# Patient Record
(demographics unavailable — no encounter records)

---

## 2024-10-23 NOTE — ASSESSMENT
[FreeTextEntry1] : I had a long discussion with the patient regarding her treatment plan and diagnosis.  Overall she is doing well.  No severe neck pain.  She is doing her actives of daily living with her collar on.  I will have her follow-up in 4 weeks.  At that point we will get flexion-extension films to ensure fracture stability and consider possibly removing the collar at that point.

## 2024-10-23 NOTE — HISTORY OF PRESENT ILLNESS
[de-identified] : 51 yo female following up fro her C6-C7 lamina fracture with bilateral subluxation noted on MRI. DOI 8/25/2024, 8 weeks from injury.   10/2/2024 52 year old female presenting to the office for a follow up evaluation of neck pain. She continues to have pain pertaining to her neck at this time. She is currently wearing a neck brace at this time. She states that she has difficulty laying down due to pain.   09/18/2024 ROSIO CHANG is a 52 year old female presenting to the office for a follow up evaluation of neck pain.  Today the patient states that she still dealing with neck pain.  She has been adjusting her collar.  She denies any radiating type pain.  She denies any weakness numbness or tingling.   09.04.2024 Patient is a 52-year-old female who presents for initial eval np. Patient states she was at an indoor park on August 25, and she slid down a slide and landed on her head, neck, and shoulders. States she went to the ER the day after this injury. Today, she is wearing a cervical collar. Improvement of neck and shoulder sxs with castor oil rub and hot showers. Denies any significant neck pain or UE radicular sxs.

## 2024-10-23 NOTE — PHYSICAL EXAM
[de-identified] : Cervical Physical Exam   Gait - Normal   Station - Normal   Sagittal balance - Normal   Compensatory mechanism? - None   Horizontal gaze - Maintained    Reflexes   Biceps - Normal   Triceps - Normal   Brachioradialis - Normal   Patellar - Normal   Gastroc - Normal   Clonus -No   Hoffmans - None   Shoulder exam- normal   Spurling's - None   Wrist Pulses -2+ radial/ulnar   Foot Pulses -2+ DP/PT   Cervical range of motion - Normal   Sensation   C5-T1 sensation intact to light touch bilaterally   L1-S1 sensation intact to light touch bilaterally   Motor          Deltoid Bicep Triceps WF WE IO  Right    5/5     5/5       5/5    5/5  5/5 5/5 5/5 Left      5/5     5/5       5/5    5/5  5/5 5/5 5/5             IP Quad HS TA Gastroc EHL Right 5/5  5/5  5/5 5/5    5/5      5/5 Left   5/5  5/5  5/5 5/5    5/5      5/5  Of note the patient came in with a loose collar.  This had to be tightened [de-identified] : previous imaging Cervical MRI: C6-C7 laminar fracture, bilateral subluxation  Cervical radiographs, 10/23 Stable overall alignment

## 2024-11-20 NOTE — PHYSICAL EXAM
[de-identified] : Cervical Physical Exam   Gait - Normal   Station - Normal   Sagittal balance - Normal   Compensatory mechanism? - None   Horizontal gaze - Maintained    Reflexes   Biceps - Normal   Triceps - Normal   Brachioradialis - Normal   Patellar - Normal   Gastroc - Normal   Clonus -No   Hoffmans - None   Shoulder exam- normal   Spurling's - None   Wrist Pulses -2+ radial/ulnar   Foot Pulses -2+ DP/PT   Cervical range of motion - Normal   Sensation   C5-T1 sensation intact to light touch bilaterally   L1-S1 sensation intact to light touch bilaterally   Motor          Deltoid Bicep Triceps WF WE IO  Right    5/5     5/5       5/5    5/5  5/5 5/5 5/5 Left      5/5     5/5       5/5    5/5  5/5 5/5 5/5             IP Quad HS TA Gastroc EHL Right 5/5  5/5  5/5 5/5    5/5      5/5 Left   5/5  5/5  5/5 5/5    5/5      5/5  Of note the patient came in with a loose collar.  This had to be tightened [de-identified] : Cervical xray C6-C7  is subluxated, but appears stable with no motion on flexion/extension  previous imaging Cervical MRI: C6-C7 laminar fracture, bilateral subluxation  Cervical radiographs, 10/23 Stable overall alignment

## 2024-11-20 NOTE — ASSESSMENT
[FreeTextEntry1] : I had a long discussion with the patient regarding her treatment plan and diagnosis.  Overall she is doing well. Patient may wean from cervical collar as tolerated. However, patient should use the cervical collar when she experiences cervical fatigue. The patient will follow up with me in 2 weeks to discuss starting physical therapy. I encouraged the patient to follow-up sooner if there are any new or worsening symptoms.

## 2024-11-20 NOTE — HISTORY OF PRESENT ILLNESS
[de-identified] : 53 yo female following up 12 weeks out s/p from her C6-C7 lamina fracture with bilateral subluxation noted on MRI. DOI 8/25/2024, 8 weeks from injury. Patient reports symptomatic improvement from previous visit with immobilization in a cervical collar. She does report of shortness of breath and right sided chest pain when ascending and descending stairs and bending forward.   10.23.24 53 yo female following up from her C6-C7 lamina fracture with bilateral subluxation noted on MRI. DOI 8/25/2024, 8 weeks from injury.   10/2/2024 52 year old female presenting to the office for a follow up evaluation of neck pain. She continues to have pain pertaining to her neck at this time. She is currently wearing a neck brace at this time. She states that she has difficulty laying down due to pain.   09/18/2024 ROSIO CHANG is a 52 year old female presenting to the office for a follow up evaluation of neck pain.  Today the patient states that she still dealing with neck pain.  She has been adjusting her collar.  She denies any radiating type pain.  She denies any weakness numbness or tingling.   09.04.2024 Patient is a 52-year-old female who presents for initial eval np. Patient states she was at an indoor park on August 25, and she slid down a slide and landed on her head, neck, and shoulders. States she went to the ER the day after this injury. Today, she is wearing a cervical collar. Improvement of neck and shoulder sxs with castor oil rub and hot showers. Denies any significant neck pain or UE radicular sxs.

## 2024-12-09 NOTE — ASSESSMENT
[FreeTextEntry1] : I had a long discussion with the patient regarding her treatment plan and diagnosis.  Overall she is doing well. At this time, the patient should refrain from physical therapy. I discussed that the patient should follow up with her PCP about her frequent headaches. The patient will follow up with me in 2 weeks to discuss starting physical therapy. I encouraged the patient to follow-up sooner if there are any new or worsening symptoms.

## 2024-12-09 NOTE — PHYSICAL EXAM
[de-identified] : Cervical Physical Exam   Gait - Normal   Station - Normal   Sagittal balance - Normal   Compensatory mechanism? - None   Horizontal gaze - Maintained    Reflexes   Biceps - Normal   Triceps - Normal   Brachioradialis - Normal   Patellar - Normal   Gastroc - Normal   Clonus -No   Hoffmans - None   Shoulder exam- normal   Spurling's - None   Wrist Pulses -2+ radial/ulnar   Foot Pulses -2+ DP/PT   Cervical range of motion - Normal   Sensation   C5-T1 sensation intact to light touch bilaterally   L1-S1 sensation intact to light touch bilaterally   Motor          Deltoid Bicep Triceps WF WE IO  Right    5/5 5/5       5/5    5/5  5/5 5/5 5/5 Left      5/5     5/5       5/5 5/5  5/5 5/5 5/5             IP Quad HS TA Gastroc EHL Right 5/5 5/5  5/5 5/5    5/5      5/5 Left   5/5  5/5  5/5 5/5    5/5      5/5 [de-identified] : Cervical xray C6-C7  is subluxated, but appears stable with no motion on flexion/extension Stable alignment  previous imaging Cervical MRI: C6-C7 laminar fracture, bilateral subluxation  Cervical radiographs, 10/23 Stable overall alignment

## 2024-12-09 NOTE — ADDENDUM
[FreeTextEntry1] :  I, Neda Bailey, acted solely as a scribe for Dr. Rush Booth MD on this date 12/09/2024    All medical record entries made by the Scribe were at my, Dr. Rush Booth MD., direction and personally dictated by me on 12/09/2024 . I have reviewed the chart and agree that the record accurately reflects my personal performance of the history, physical exam, assessment and plan. I have also personally directed, reviewed, and agreed with the chart.

## 2024-12-09 NOTE — HISTORY OF PRESENT ILLNESS
[de-identified] : 51 yo female following up 3 months out s/p from her C6-C7 lamina fracture with bilateral subluxation noted on MRI. DOI 8/25/2024, 10 weeks from injury. She reports occasional headaches. She also reports pain in the back of her neck which is exacerbated by turning her neck.   11.20.24 51 yo female following up 12 weeks out s/p from her C6-C7 lamina fracture with bilateral subluxation noted on MRI. DOI 8/25/2024, 8 weeks from injury. Patient reports symptomatic improvement from previous visit with immobilization in a cervical collar. She does report of shortness of breath and right sided chest pain when ascending and descending stairs and bending forward.   10.23.24 51 yo female following up from her C6-C7 lamina fracture with bilateral subluxation noted on MRI. DOI 8/25/2024, 8 weeks from injury.   10/2/2024 52 year old female presenting to the office for a follow up evaluation of neck pain. She continues to have pain pertaining to her neck at this time. She is currently wearing a neck brace at this time. She states that she has difficulty laying down due to pain.   09/18/2024 ROSIO CHANG is a 52 year old female presenting to the office for a follow up evaluation of neck pain.  Today the patient states that she still dealing with neck pain.  She has been adjusting her collar.  She denies any radiating type pain.  She denies any weakness numbness or tingling.   09.04.2024 Patient is a 52-year-old female who presents for initial eval np. Patient states she was at an indoor park on August 25, and she slid down a slide and landed on her head, neck, and shoulders. States she went to the ER the day after this injury. Today, she is wearing a cervical collar. Improvement of neck and shoulder sxs with castor oil rub and hot showers. Denies any significant neck pain or UE radicular sxs.

## 2025-01-08 NOTE — HISTORY OF PRESENT ILLNESS
[de-identified] : 54 yo female following up 4.5 months out s/p from her C6-C7 lamina fracture with bilateral subluxation noted on MRI. DOI 8/25/2024. She reports that she feels dizzy when she looks down or turns around quickly. She states that she has some shoulder pain that is worse in the morning.   12.09.24 54 yo female following up 3 months out s/p from her C6-C7 lamina fracture with bilateral subluxation noted on MRI. DOI 8/25/2024, 10 weeks from injury. She reports occasional headaches. She also reports pain in the back of her neck which is exacerbated by turning her neck.   11.20.24 51 yo female following up 12 weeks out s/p from her C6-C7 lamina fracture with bilateral subluxation noted on MRI. DOI 8/25/2024, 8 weeks from injury. Patient reports symptomatic improvement from previous visit with immobilization in a cervical collar. She does report of shortness of breath and right sided chest pain when ascending and descending stairs and bending forward.   10.23.24 51 yo female following up from her C6-C7 lamina fracture with bilateral subluxation noted on MRI. DOI 8/25/2024, 8 weeks from injury.   10/2/2024 52 year old female presenting to the office for a follow up evaluation of neck pain. She continues to have pain pertaining to her neck at this time. She is currently wearing a neck brace at this time. She states that she has difficulty laying down due to pain.   09/18/2024 ROSIO CHANG is a 52 year old female presenting to the office for a follow up evaluation of neck pain.  Today the patient states that she still dealing with neck pain.  She has been adjusting her collar.  She denies any radiating type pain.  She denies any weakness numbness or tingling.   09.04.2024 Patient is a 52-year-old female who presents for initial eval np. Patient states she was at an indoor park on August 25, and she slid down a slide and landed on her head, neck, and shoulders. States she went to the ER the day after this injury. Today, she is wearing a cervical collar. Improvement of neck and shoulder sxs with castor oil rub and hot showers. Denies any significant neck pain or UE radicular sxs.

## 2025-01-08 NOTE — ASSESSMENT
[FreeTextEntry1] : I had a long discussion with the patient regarding her treatment plan and diagnosis. Overall she is doing well. The patient will start a home exercise program at this time. The patient will follow up with me in 3 weeks to discuss starting physical therapy. I encouraged the patient to follow-up sooner if there are any new or worsening symptoms.

## 2025-01-08 NOTE — PHYSICAL EXAM
[de-identified] : Cervical Physical Exam   Gait - Normal   Station - Normal   Sagittal balance - Normal   Compensatory mechanism? - None   Horizontal gaze - Maintained    Reflexes   Biceps - Normal   Triceps - Normal   Brachioradialis - Normal   Patellar - Normal   Gastroc - Normal   Clonus -No   Hoffmans - None   Shoulder exam- normal   Spurling's - None   Wrist Pulses -2+ radial/ulnar   Foot Pulses -2+ DP/PT   Cervical range of motion - Normal   Sensation   C5-T1 sensation intact to light touch bilaterally   L1-S1 sensation intact to light touch bilaterally   Motor          Deltoid Bicep Triceps WF WE IO  Right    5/5 5/5       5/5    5/5  5/5 5/5 5/5 Left      5/5     5/5       5/5 5/5  5/5 5/5 5/5             IP Quad HS TA Gastroc EHL Right 5/5 5/5  5/5 5/5    5/5      5/5 Left   5/5  5/5  5/5 5/5    5/5      5/5 [de-identified] : previous imaging: Cervical xray C6-C7  is subluxated, but appears stable with no motion on flexion/extension Stable alignment  Cervical MRI: C6-C7 laminar fracture, bilateral subluxation  Cervical radiographs, 10/23 Stable overall alignment

## 2025-01-08 NOTE — ADDENDUM
[FreeTextEntry1] :  I, Neda Bailey, acted solely as a scribe for Dr. Rush Booth MD on this date 01/08/2025    All medical record entries made by the Scribe were at my, Dr. Rush Booth MD., direction and personally dictated by me on 01/08/2025 . I have reviewed the chart and agree that the record accurately reflects my personal performance of the history, physical exam, assessment and plan. I have also personally directed, reviewed, and agreed with the chart.

## 2025-01-08 NOTE — ASSESSMENT
[FreeTextEntry1] : I had a long discussion with the patient regarding her treatment plan and diagnosis. Overall she is doing well. The patient will start a home exercise program at this time. The patient will follow up with me in 3 weeks to discuss starting physical therapy. I encouraged the patient to follow-up sooner if there are any new or worsening symptoms.      none

## 2025-01-08 NOTE — PHYSICAL EXAM
[de-identified] : Cervical Physical Exam   Gait - Normal   Station - Normal   Sagittal balance - Normal   Compensatory mechanism? - None   Horizontal gaze - Maintained    Reflexes   Biceps - Normal   Triceps - Normal   Brachioradialis - Normal   Patellar - Normal   Gastroc - Normal   Clonus -No   Hoffmans - None   Shoulder exam- normal   Spurling's - None   Wrist Pulses -2+ radial/ulnar   Foot Pulses -2+ DP/PT   Cervical range of motion - Normal   Sensation   C5-T1 sensation intact to light touch bilaterally   L1-S1 sensation intact to light touch bilaterally   Motor          Deltoid Bicep Triceps WF WE IO  Right    5/5 5/5       5/5    5/5  5/5 5/5 5/5 Left      5/5     5/5       5/5 5/5  5/5 5/5 5/5             IP Quad HS TA Gastroc EHL Right 5/5 5/5  5/5 5/5    5/5      5/5 Left   5/5  5/5  5/5 5/5    5/5      5/5 [de-identified] : previous imaging: Cervical xray C6-C7  is subluxated, but appears stable with no motion on flexion/extension Stable alignment  Cervical MRI: C6-C7 laminar fracture, bilateral subluxation  Cervical radiographs, 10/23 Stable overall alignment

## 2025-01-08 NOTE — HISTORY OF PRESENT ILLNESS
[de-identified] : 54 yo female following up 4.5 months out s/p from her C6-C7 lamina fracture with bilateral subluxation noted on MRI. DOI 8/25/2024. She reports that she feels dizzy when she looks down or turns around quickly. She states that she has some shoulder pain that is worse in the morning.   12.09.24 54 yo female following up 3 months out s/p from her C6-C7 lamina fracture with bilateral subluxation noted on MRI. DOI 8/25/2024, 10 weeks from injury. She reports occasional headaches. She also reports pain in the back of her neck which is exacerbated by turning her neck.   11.20.24 51 yo female following up 12 weeks out s/p from her C6-C7 lamina fracture with bilateral subluxation noted on MRI. DOI 8/25/2024, 8 weeks from injury. Patient reports symptomatic improvement from previous visit with immobilization in a cervical collar. She does report of shortness of breath and right sided chest pain when ascending and descending stairs and bending forward.   10.23.24 51 yo female following up from her C6-C7 lamina fracture with bilateral subluxation noted on MRI. DOI 8/25/2024, 8 weeks from injury.   10/2/2024 52 year old female presenting to the office for a follow up evaluation of neck pain. She continues to have pain pertaining to her neck at this time. She is currently wearing a neck brace at this time. She states that she has difficulty laying down due to pain.   09/18/2024 ROSIO CHANG is a 52 year old female presenting to the office for a follow up evaluation of neck pain.  Today the patient states that she still dealing with neck pain.  She has been adjusting her collar.  She denies any radiating type pain.  She denies any weakness numbness or tingling.   09.04.2024 Patient is a 52-year-old female who presents for initial eval np. Patient states she was at an indoor park on August 25, and she slid down a slide and landed on her head, neck, and shoulders. States she went to the ER the day after this injury. Today, she is wearing a cervical collar. Improvement of neck and shoulder sxs with castor oil rub and hot showers. Denies any significant neck pain or UE radicular sxs.

## 2025-02-03 NOTE — ASSESSMENT
[FreeTextEntry1] : I had a long discussion with the patient regarding her treatment plan and diagnosis. Overall she is doing well. The patient will start physical therapy and continue a home exercise program at this time. She will continue following up with a neurologist. The patient will follow up with me in 4 weeks for repeat clinical evaluation. I encouraged the patient to follow-up sooner if there are any new or worsening symptoms.

## 2025-02-03 NOTE — HISTORY OF PRESENT ILLNESS
[de-identified] : 54 yo female following up 5 months out s/p from her C6-C7 lamina fracture with bilateral subluxation noted on MRI. DOI 8/25/2024. She reports that she has pain in her neck which is worse in the morning. She states that she has dizziness in the morning as well. She states that she has stiffness in her neck which is exacerbated by cold weather. She states that she applies castor oil on her neck which provides relief. Patient reports that she has been doing the home exercises which have provided relief. She states that she is scheduled for a neurology appointment.   01.08.25 54 yo female following up 4.5 months out s/p from her C6-C7 lamina fracture with bilateral subluxation noted on MRI. DOI 8/25/2024. She reports that she feels dizzy when she looks down or turns around quickly. She states that she has some shoulder pain that is worse in the morning.   12.09.24 54 yo female following up 3 months out s/p from her C6-C7 lamina fracture with bilateral subluxation noted on MRI. DOI 8/25/2024, 10 weeks from injury. She reports occasional headaches. She also reports pain in the back of her neck which is exacerbated by turning her neck.   11.20.24 53 yo female following up 12 weeks out s/p from her C6-C7 lamina fracture with bilateral subluxation noted on MRI. DOI 8/25/2024, 8 weeks from injury. Patient reports symptomatic improvement from previous visit with immobilization in a cervical collar. She does report of shortness of breath and right sided chest pain when ascending and descending stairs and bending forward.   10.23.24 53 yo female following up from her C6-C7 lamina fracture with bilateral subluxation noted on MRI. DOI 8/25/2024, 8 weeks from injury.   10/2/2024 52 year old female presenting to the office for a follow up evaluation of neck pain. She continues to have pain pertaining to her neck at this time. She is currently wearing a neck brace at this time. She states that she has difficulty laying down due to pain.   09/18/2024 ROSIO CHANG is a 52 year old female presenting to the office for a follow up evaluation of neck pain.  Today the patient states that she still dealing with neck pain.  She has been adjusting her collar.  She denies any radiating type pain.  She denies any weakness numbness or tingling.   09.04.2024 Patient is a 52-year-old female who presents for initial eval np. Patient states she was at an indoor park on August 25, and she slid down a slide and landed on her head, neck, and shoulders. States she went to the ER the day after this injury. Today, she is wearing a cervical collar. Improvement of neck and shoulder sxs with castor oil rub and hot showers. Denies any significant neck pain or UE radicular sxs.

## 2025-02-03 NOTE — PHYSICAL EXAM
[de-identified] : Cervical Physical Exam   Gait - Normal   Station - Normal   Sagittal balance - Normal   Compensatory mechanism? - None   Horizontal gaze - Maintained    Reflexes   Biceps - Normal   Triceps - Normal   Brachioradialis - Normal   Patellar - Normal   Gastroc - Normal   Clonus -No   Hoffmans - None   Shoulder exam- normal   Spurling's - None   Wrist Pulses -2+ radial/ulnar   Foot Pulses -2+ DP/PT   Cervical range of motion - Normal   Sensation   C5-T1 sensation intact to light touch bilaterally   L1-S1 sensation intact to light touch bilaterally   Motor          Deltoid Bicep Triceps WF WE IO  Right    5/5 5/5       5/5    5/5  5/5 5/5 5/5 Left      5/5     5/5       5/5 5/5  5/5 5/5 5/5             IP Quad HS TA Gastroc EHL Right 5/5 5/5  5/5 5/5    5/5      5/5 Left   5/5  5/5  5/5 5/5    5/5      5/5 [de-identified] : previous imaging: Cervical xray C6-C7  is subluxated, but appears stable with no motion on flexion/extension Stable alignment  Cervical MRI: C6-C7 laminar fracture, bilateral subluxation  Cervical radiographs, 10/23 Stable overall alignment

## 2025-02-03 NOTE — ADDENDUM
[FreeTextEntry1] :  I, Neda Bailey, acted solely as a scribe for Dr. Rush Booth MD on this date 02/03/2025    All medical record entries made by the Scribe were at my, Dr. Rush Booth MD., direction and personally dictated by me on 02/03/2025 . I have reviewed the chart and agree that the record accurately reflects my personal performance of the history, physical exam, assessment and plan. I have also personally directed, reviewed, and agreed with the chart.

## 2025-03-19 NOTE — HISTORY OF PRESENT ILLNESS
[de-identified] : 52 yo female following up 6.5 months out s/p from her C6-C7 lamina fracture with bilateral subluxation noted on MRI. DOI 8/25/2024. She has been attending physical therapy which has provided relief. She states that her cervical range of motion has improved. She reports that she feels her neck is stronger. She is not taking any pain medications.  02.03.25 52 yo female following up 5 months out s/p from her C6-C7 lamina fracture with bilateral subluxation noted on MRI. DOI 8/25/2024. She reports that she has pain in her neck which is worse in the morning. She states that she has dizziness in the morning as well. She states that she has stiffness in her neck which is exacerbated by cold weather. She states that she applies castor oil on her neck which provides relief. Patient reports that she has been doing the home exercises which have provided relief. She states that she is scheduled for a neurology appointment.   01.08.25 52 yo female following up 4.5 months out s/p from her C6-C7 lamina fracture with bilateral subluxation noted on MRI. DOI 8/25/2024. She reports that she feels dizzy when she looks down or turns around quickly. She states that she has some shoulder pain that is worse in the morning.   12.09.24 52 yo female following up 3 months out s/p from her C6-C7 lamina fracture with bilateral subluxation noted on MRI. DOI 8/25/2024, 10 weeks from injury. She reports occasional headaches. She also reports pain in the back of her neck which is exacerbated by turning her neck.   11.20.24 51 yo female following up 12 weeks out s/p from her C6-C7 lamina fracture with bilateral subluxation noted on MRI. DOI 8/25/2024, 8 weeks from injury. Patient reports symptomatic improvement from previous visit with immobilization in a cervical collar. She does report of shortness of breath and right sided chest pain when ascending and descending stairs and bending forward.   10.23.24 51 yo female following up from her C6-C7 lamina fracture with bilateral subluxation noted on MRI. DOI 8/25/2024, 8 weeks from injury.   10/2/2024 52 year old female presenting to the office for a follow up evaluation of neck pain. She continues to have pain pertaining to her neck at this time. She is currently wearing a neck brace at this time. She states that she has difficulty laying down due to pain.   09/18/2024 ROSIO CHANG is a 52 year old female presenting to the office for a follow up evaluation of neck pain.  Today the patient states that she still dealing with neck pain.  She has been adjusting her collar.  She denies any radiating type pain.  She denies any weakness numbness or tingling.   09.04.2024 Patient is a 52-year-old female who presents for initial eval np. Patient states she was at an indoor park on August 25, and she slid down a slide and landed on her head, neck, and shoulders. States she went to the ER the day after this injury. Today, she is wearing a cervical collar. Improvement of neck and shoulder sxs with castor oil rub and hot showers. Denies any significant neck pain or UE radicular sxs.

## 2025-03-19 NOTE — ASSESSMENT
[FreeTextEntry1] : I had a long discussion with the patient regarding her treatment plan and diagnosis. Overall she is doing well. The patient will start physical therapy and continue a home exercise program at this time. The patient will follow up with me in 3 months for repeat clinical evaluation. I encouraged the patient to follow-up sooner if there are any new or worsening symptoms.

## 2025-03-19 NOTE — PHYSICAL EXAM
[de-identified] : Cervical Physical Exam   Gait - Normal   Station - Normal   Sagittal balance - Normal   Compensatory mechanism? - None   Horizontal gaze - Maintained    Reflexes   Biceps - Normal   Triceps - Normal   Brachioradialis - Normal   Patellar - Normal   Gastroc - Normal   Clonus -No   Hoffmans - None   Shoulder exam- normal   Spurling's - None   Wrist Pulses -2+ radial/ulnar   Foot Pulses -2+ DP/PT   Cervical range of motion - improved   Sensation   C5-T1 sensation intact to light touch bilaterally   L1-S1 sensation intact to light touch bilaterally   Motor          Deltoid Bicep Triceps WF WE IO  Right    5/5     5/5       5/5    5/5  5/5 5/5 5/5 Left      5/5     5/5       5/5 5/5  5/5 5/5 5/5             IP Quad HS TA Gastroc EHL Right 5/5  5/5  5/5 5/5    5/5      5/5 Left   5/5  5/5  5/5 5/5    5/5      5/5 [de-identified] : previous imaging: Cervical xray C6-C7  is subluxated, but appears stable with no motion on flexion/extension Stable alignment  Cervical MRI: C6-C7 laminar fracture, bilateral subluxation  Cervical radiographs, 10/23 Stable overall alignment

## 2025-04-30 NOTE — PHYSICAL EXAM
[de-identified] : Cervical Physical Exam   Gait - Normal   Station - Normal   Sagittal balance - Normal   Compensatory mechanism? - None   Horizontal gaze - Maintained    Reflexes   Biceps - Normal   Triceps - Normal   Brachioradialis - Normal   Patellar - Normal   Gastroc - Normal   Clonus -No   Hoffmans - None   Shoulder exam- normal   Spurling's - None   Wrist Pulses -2+ radial/ulnar   Foot Pulses -2+ DP/PT   Cervical range of motion - improved   Sensation   C5-T1 sensation intact to light touch bilaterally   L1-S1 sensation intact to light touch bilaterally   Motor          Deltoid Bicep Triceps WF WE IO  Right    5/5 5/5       5/5    5/5  5/5 5/5 3+/5 Left      5/5     5/5       5/5    5/5  5/5 5/5 3+/5             IP Quad HS TA Gastroc EHL Right 5/5 5/5  5/5 5/5    5/5      5/5 Left   5/5 5/5  5/5 5/5    5/5      5/5 [de-identified] : cervical rads no motion on flex/ex Stable alignment  previous imaging: Cervical xray C6-C7 is subluxated, but appears stable with no motion on flexion/extension Stable alignment  Cervical MRI: C6-C7 laminar fracture, bilateral subluxation  Cervical radiographs, 10/23 Stable overall alignment

## 2025-04-30 NOTE — ASSESSMENT
[FreeTextEntry1] :  I had a lengthy discussion with the patient in regards to the treatment plan and diagnosis.  The patient does have objective weakness findings on my exam.  Furthermore I am concerned in regards to compression along the patient's spinal cord and/or nerve roots.  As a result I would like to proceed with an MRI of the patient's cervical spine and CT scan of the cervical spine to evaluate for healing.  In tandem with this the patient should begin physical therapy focused on their neck. Prescribed Diclofenac and Tizanidine. We recommended that she go to the emergency room if her headaches worsen. I will have the patient follow-up in 3 to 4 weeks for repeat clinical evaluation.  I encouraged the patient to reach out to me directly at any point if their symptoms worsen or change in any way. Patient has a known history of a displaced cervical fracture and we would like to evaluate neuro elements given her numbness and tingling.    The patient has tried the following treatments: Activity modification                + Ice/Compression                     + Braces                                     - NSAIDS                                  + Physical Therapy                    +  Please note the above modalities have been tried for 6+ weeks over the past 2 months

## 2025-04-30 NOTE — HISTORY OF PRESENT ILLNESS
[de-identified] : 54 yo female following up 8 months out s/p from her C6-C7 lamina fracture with bilateral subluxation noted on MRI. DOI 8/25/2024. She reports that she has headaches throughout the day. She is taking Aleve. Patient denies bowel/bladder incontinence. Denies saddle anesthesia.  03.19.25 54 yo female following up 6.5 months out s/p from her C6-C7 lamina fracture with bilateral subluxation noted on MRI. DOI 8/25/2024. She has been attending physical therapy which has provided relief. She states that her cervical range of motion has improved. She reports that she feels her neck is stronger. She is not taking any pain medications.  02.03.25 54 yo female following up 5 months out s/p from her C6-C7 lamina fracture with bilateral subluxation noted on MRI. DOI 8/25/2024. She reports that she has pain in her neck which is worse in the morning. She states that she has dizziness in the morning as well. She states that she has stiffness in her neck which is exacerbated by cold weather. She states that she applies castor oil on her neck which provides relief. Patient reports that she has been doing the home exercises which have provided relief. She states that she is scheduled for a neurology appointment.   01.08.25 54 yo female following up 4.5 months out s/p from her C6-C7 lamina fracture with bilateral subluxation noted on MRI. DOI 8/25/2024. She reports that she feels dizzy when she looks down or turns around quickly. She states that she has some shoulder pain that is worse in the morning.   12.09.24 54 yo female following up 3 months out s/p from her C6-C7 lamina fracture with bilateral subluxation noted on MRI. DOI 8/25/2024, 10 weeks from injury. She reports occasional headaches. She also reports pain in the back of her neck which is exacerbated by turning her neck.   11.20.24 53 yo female following up 12 weeks out s/p from her C6-C7 lamina fracture with bilateral subluxation noted on MRI. DOI 8/25/2024, 8 weeks from injury. Patient reports symptomatic improvement from previous visit with immobilization in a cervical collar. She does report of shortness of breath and right sided chest pain when ascending and descending stairs and bending forward.   10.23.24 53 yo female following up from her C6-C7 lamina fracture with bilateral subluxation noted on MRI. DOI 8/25/2024, 8 weeks from injury.   10/2/2024 52 year old female presenting to the office for a follow up evaluation of neck pain. She continues to have pain pertaining to her neck at this time. She is currently wearing a neck brace at this time. She states that she has difficulty laying down due to pain.   09/18/2024 ROSIO CHANG is a 52 year old female presenting to the office for a follow up evaluation of neck pain.  Today the patient states that she still dealing with neck pain.  She has been adjusting her collar.  She denies any radiating type pain.  She denies any weakness numbness or tingling.   09.04.2024 Patient is a 52-year-old female who presents for initial eval np. Patient states she was at an indoor park on August 25, and she slid down a slide and landed on her head, neck, and shoulders. States she went to the ER the day after this injury. Today, she is wearing a cervical collar. Improvement of neck and shoulder sxs with castor oil rub and hot showers. Denies any significant neck pain or UE radicular sxs.

## 2025-04-30 NOTE — ADDENDUM
[FreeTextEntry1] :  I, Neda Bailey, acted solely as a scribe for Dr. Rush Booth MD on this date 04/30/2025    All medical record entries made by the Scribe were at my, Dr. Rush Booth MD., direction and personally dictated by me on 04/30/2025 . I have reviewed the chart and agree that the record accurately reflects my personal performance of the history, physical exam, assessment and plan. I have also personally directed, reviewed, and agreed with the chart.

## 2025-06-11 NOTE — ADDENDUM
[FreeTextEntry1] :  I, Neda Bailey, acted solely as a scribe for Dr. Rush Booth MD on this date 06/11/2025    All medical record entries made by the Scribe were at my, Dr. Rush Booth MD., direction and personally dictated by me on 06/11/2025 . I have reviewed the chart and agree that the record accurately reflects my personal performance of the history, physical exam, assessment and plan. I have also personally directed, reviewed, and agreed with the chart.

## 2025-06-11 NOTE — PHYSICAL EXAM
[de-identified] : Cervical Physical Exam   Gait - Normal   Station - Normal   Sagittal balance - Normal   Compensatory mechanism? - None   Horizontal gaze - Maintained    Reflexes   Biceps - Normal   Triceps - Normal   Brachioradialis - Normal   Patellar - Normal   Gastroc - Normal   Clonus -No   Hoffmans - None   Shoulder exam- normal   Spurling's - None   Wrist Pulses -2+ radial/ulnar   Foot Pulses -2+ DP/PT   Cervical range of motion - improved   Sensation   C5-T1 sensation intact to light touch bilaterally   L1-S1 sensation intact to light touch bilaterally   Motor          Deltoid Bicep Triceps WF WE IO  Right    5/5 5/5       5/5    5/5  5/5 5/5 3+/5 Left      5/5     5/5       5/5    5/5  5/5 5/5 3+/5             IP Quad HS TA Gastroc EHL Right 5/5 5/5  5/5 5/5    5/5      5/5 Left   5/5 5/5  5/5 5/5    5/5      5/5 [de-identified] : cervical MRI ([facility]) no critical areas of central stenosis foraminal stenosis noted in subaxial spine  previous imaging: cervical rads no motion on flex/ex Stable alignment  Cervical xray C6-C7 is subluxated, but appears stable with no motion on flexion/extension Stable alignment  Cervical MRI: C6-C7 laminar fracture, bilateral subluxation  Cervical radiographs, 10/23 Stable overall alignment

## 2025-06-11 NOTE — HISTORY OF PRESENT ILLNESS
[de-identified] : 54 yo female following up 9.5 months out s/p from her C6-C7 lamina fracture with bilateral subluxation noted on MRI. DOI 8/25/2024. She is here to review her cervical MRI. She states that she is still having headaches and neck spasms. Denies UE pain.  04.30.25 54 yo female following up 8 months out s/p from her C6-C7 lamina fracture with bilateral subluxation noted on MRI. DOI 8/25/2024. She reports that she has headaches throughout the day. She is taking Aleve. Patient denies bowel/bladder incontinence. Denies saddle anesthesia.  03.19.25 54 yo female following up 6.5 months out s/p from her C6-C7 lamina fracture with bilateral subluxation noted on MRI. DOI 8/25/2024. She has been attending physical therapy which has provided relief. She states that her cervical range of motion has improved. She reports that she feels her neck is stronger. She is not taking any pain medications.  02.03.25 54 yo female following up 5 months out s/p from her C6-C7 lamina fracture with bilateral subluxation noted on MRI. DOI 8/25/2024. She reports that she has pain in her neck which is worse in the morning. She states that she has dizziness in the morning as well. She states that she has stiffness in her neck which is exacerbated by cold weather. She states that she applies castor oil on her neck which provides relief. Patient reports that she has been doing the home exercises which have provided relief. She states that she is scheduled for a neurology appointment.   01.08.25 54 yo female following up 4.5 months out s/p from her C6-C7 lamina fracture with bilateral subluxation noted on MRI. DOI 8/25/2024. She reports that she feels dizzy when she looks down or turns around quickly. She states that she has some shoulder pain that is worse in the morning.   12.09.24 54 yo female following up 3 months out s/p from her C6-C7 lamina fracture with bilateral subluxation noted on MRI. DOI 8/25/2024, 10 weeks from injury. She reports occasional headaches. She also reports pain in the back of her neck which is exacerbated by turning her neck.   11.20.24 51 yo female following up 12 weeks out s/p from her C6-C7 lamina fracture with bilateral subluxation noted on MRI. DOI 8/25/2024, 8 weeks from injury. Patient reports symptomatic improvement from previous visit with immobilization in a cervical collar. She does report of shortness of breath and right sided chest pain when ascending and descending stairs and bending forward.   10.23.24 51 yo female following up from her C6-C7 lamina fracture with bilateral subluxation noted on MRI. DOI 8/25/2024, 8 weeks from injury.   10/2/2024 52 year old female presenting to the office for a follow up evaluation of neck pain. She continues to have pain pertaining to her neck at this time. She is currently wearing a neck brace at this time. She states that she has difficulty laying down due to pain.   09/18/2024 ROSIO CHANG is a 52 year old female presenting to the office for a follow up evaluation of neck pain.  Today the patient states that she still dealing with neck pain.  She has been adjusting her collar.  She denies any radiating type pain.  She denies any weakness numbness or tingling.   09.04.2024 Patient is a 52-year-old female who presents for initial eval np. Patient states she was at an indoor park on August 25, and she slid down a slide and landed on her head, neck, and shoulders. States she went to the ER the day after this injury. Today, she is wearing a cervical collar. Improvement of neck and shoulder sxs with castor oil rub and hot showers. Denies any significant neck pain or UE radicular sxs.

## 2025-06-11 NOTE — ASSESSMENT
[FreeTextEntry1] :  I had a lengthy discussion with the patient in regards to treatment plan and diagnosis. There are no red flag findings on imaging nor are there any red flag findings on clinical exams. Therefore we will proceed with a course of conservative treatment. This would include physical therapy, Tylenol, NSAIDs as medically indicated. I discussed that she should follow up with a neurologist. The patient will follow up with me in 3 months.  I encouraged the patient to follow-up sooner if there are any new or worsening symptoms.